# Patient Record
Sex: FEMALE | Race: WHITE
[De-identification: names, ages, dates, MRNs, and addresses within clinical notes are randomized per-mention and may not be internally consistent; named-entity substitution may affect disease eponyms.]

---

## 2018-08-08 ENCOUNTER — HOSPITAL ENCOUNTER (OUTPATIENT)
Dept: HOSPITAL 45 - C.GI | Age: 58
Discharge: HOME | End: 2018-08-08
Attending: INTERNAL MEDICINE
Payer: COMMERCIAL

## 2018-08-08 VITALS — DIASTOLIC BLOOD PRESSURE: 72 MMHG | HEART RATE: 58 BPM | SYSTOLIC BLOOD PRESSURE: 120 MMHG | OXYGEN SATURATION: 98 %

## 2018-08-08 VITALS
HEIGHT: 63.5 IN | WEIGHT: 126.26 LBS | BODY MASS INDEX: 22.09 KG/M2 | HEIGHT: 63.5 IN | BODY MASS INDEX: 22.09 KG/M2 | WEIGHT: 126.26 LBS

## 2018-08-08 VITALS — TEMPERATURE: 98.06 F

## 2018-08-08 DIAGNOSIS — Z12.11: Primary | ICD-10-CM

## 2018-08-08 DIAGNOSIS — D12.3: ICD-10-CM

## 2018-08-08 DIAGNOSIS — Z88.5: ICD-10-CM

## 2018-08-08 DIAGNOSIS — K64.8: ICD-10-CM

## 2018-08-08 NOTE — GI REPORT
Patient Name: Elaine Benavidez

Procedure Date: 8/8/2018 10:21 AM

MRN: [id number]

Account Number: [id number]

YOB: 1960

Admit Type: Outpatient

Age: 58

Gender: Female

Attending MD: Todd Sanchez DO

Procedure:            Colonoscopy

Providers:            Todd Sanchez DO

Referring MD:         ALDO Aguilar

Indications:          Screening for colorectal malignant neoplasm

Medicines:            Monitored Anesthesia Care

Complications:        No immediate complications.

Estimated Blood Loss: Estimated blood loss: none.

Procedure:            Pre-Anesthesia Assessment:

                      - Prior to the procedure, a History and Physical was 

                      performed, and patient medications and allergies were 

                      reviewed. The patient's tolerance of previous 

                      anesthesia was also reviewed. The risks and benefits of 

                      the procedure and the sedation options and risks were 

                      discussed with the patient. All questions were 

                      answered, and informed consent was obtained. Prior 

                      Anticoagulants: The patient has taken no previous 

                      anticoagulant or antiplatelet agents. ASA Grade 

                      Assessment: II - A patient with mild systemic disease. 

                      After reviewing the risks and benefits, the patient was 

                      deemed in satisfactory condition to undergo the 

                      procedure.

                      After I obtained informed consent, the scope was passed 

                      under direct vision. Throughout the procedure, the 

                      patient's blood pressure, pulse, and oxygen saturations 

                      were monitored continuously. The Scope was introduced 

                      through the anus and advanced to the terminal ileum. 

                      The colonoscopy was performed without difficulty. The 

                      patient tolerated the procedure well. The quality of 

                      the bowel preparation was good. The terminal ileum, 

                      ileocecal valve, appendiceal orifice, and rectum were 

                      photographed.

Findings:

     The perianal and digital rectal examinations were normal.

     There was evidence of a prior end-to-side ileo-colonic anastomosis in 

     the ascending colon. This was patent and was characterized by healthy 

     appearing mucosa. The anastomosis was traversed.

     Two sessile polyps were found in the sigmoid colon and transverse colon. 

     The polyps were 4 to 5 mm in size. These polyps were removed with a cold 

     snare. Resection and retrieval were complete.

     Non-bleeding internal hemorrhoids were found during retroflexion. The 

     hemorrhoids were small.

Impression:           - Patent end-to-side ileo-colonic anastomosis, 

                      characterized by healthy appearing mucosa.

                      - Two 4 to 5 mm polyps in the sigmoid colon and in the 

                      transverse colon, removed with a cold snare. Resected 

                      and retrieved.

                      - Non-bleeding internal hemorrhoids.

Recommendation:       - Resume previous diet.

                      - Continue present medications.

                      - Repeat colonoscopy for surveillance based on 

                      pathology results.

                      - Return to primary care physician as previously 

                      scheduled.

Todd FARIDEHJae Sanchez, DO

8/8/2018 11:13:33 AM

This report has been signed electronically.

Note Initiated On: 8/8/2018 10:21 AM

Number of Addenda: 0

     I attest to the content of the Intraoperative Record and orders 

     documented therein, exceptions below



{[id number]}

## 2018-08-08 NOTE — ANESTHESIOLOGY PROGRESS NOTE
Anesthesia Post Op Note


Date & Time


Aug 8, 2018 at 10:56





Vital Signs


Pain Intensity:  0





Vital Signs Past 12 Hours








  Date Time  Temp Pulse Resp B/P (MAP) Pulse Ox O2 Delivery O2 Flow Rate FiO2


 


8/8/18 10:53  66 18 108/60 (76) 96 Room Air  


 


8/8/18 10:44  65 16 106/64 (78) 97 Room Air  


 


8/8/18 09:53 36.7 77 20 121/79 (93) 96 Room Air  











Notes


Mental Status:  alert / awake / arousable, participated in evaluation


Pt Amnestic to Procedure:  Yes


Nausea / Vomiting:  adequately controlled


Pain:  adequately controlled


Airway Patency, RR, SpO2:  stable & adequate


BP & HR:  stable & adequate


Hydration State:  stable & adequate


Anesthetic Complications:  no major complications apparent

## 2018-08-08 NOTE — DISCHARGE INSTRUCTIONS
Endoscopy Patient Instructions


Date / Procedure(s) Performed


Aug 8, 2018.


Colonoscopy





Allergy Information


Coded Allergies:  


     Morphine (Verified  Allergy, Unknown, NAUSEA, 8/8/18)





Discharge Date / Findings


Aug 8, 2018.


Colon polyps


Internal hemorrhoids





Medication Instructions


OK to resume all medications today as prescribed





Reported Home Medications








 Medications  Dose


 Route/Sig


 Max Daily Dose Days Date Category


 


 Crestor


  (Rosuvastatin


 Calcium) 5 Mg Tab  1 Tab


 PO DAILY


   30 8/8/18 Reported


 


 Toprol-Xl


  (Metoprolol


 Succinate) 25 Mg


 Tabcr  25 Mg


 PO DAILY


    8/8/18 Reported


 


 [Testosterone Cr]


   1 Dose


 TOP DAILY


    8/6/18 Reported


 


 [Progesterone]    1 Tab


 PO HS


    8/6/18 Reported


 


 [Estrogen]    1 Tab


 PO TID


    8/6/18 Reported











Provider Instructions





Activity Restrictions





-  No exercising or heavy lifting for 24 hours. 


-  Do not drink alcohol the day of the procedure.


-  Do not drive a car or operate machinery until the day after the procedure.


-  Do not make any important decisions or sign important papers in 24 hours 

after the procedure.





Following Day:





-  Return to full activity which may include returning to work/school.





Diet





Start your diet with liquids and light foods (jello, soup, juice, toast).  Then 

eat your usual diet if not nauseated.





Treatment For Common After Affects





For mild abdominal pain, bloating, or excessive gas:





-  Rest


-  Eat lightly


-  Lie on right side





Follow-Up Information


Follow-up with Dr. Aguilar as scheduled





Anesthesia Information





What You Should Know





You have had a procedure that required some medicine to reduce anxiety and 

discomfort. This treatment is called moderate sedation.  


After receiving the treatment, you may be sleepy, but you will be able to 

breathe on your own.  The effects of the treatment may last for several hours.








Follow these instructions along with Activity/Diet recommendations noted above:





*  Do NOT do anything where dizziness or clumsiness would be dangerous.





*  Rest quietly at home today, then you can be up and about tomorrow.





*  Have a responsible person stay with you the rest of today.





*  You may have had an I.V. today.  If so, you may take the dressing off later 

today.





Recommendations


 


Call your doctor if:





*  Trouble breathing 





*  Continuous vomiting for more than 24 hours








*  Temperature above 101 degrees





*  Severe abdominal pain or bloating





*  Pain not relieved by pain medicine ordered





*  There is increased drainage or redness from any incision





*  A large amount of rectal bleeding greater than 2-3 tablespoons. 


   (If you had a polyp/s removed or have hemorrhoids, a small amount of blood -


    from the rectum is to be expected.)





*  You have any unanswered questions or concerns.








IN THE EVENT OF A SERIOUS EMERGENCY, GO TO THE NEAREST EMERGENCY ROOM








       Your discharge instructions were prepared by provider Todd Sanchez.





 Patient Instructions Signature Page














Elaine Benavidez 











Patient (or Guardian) Signature/Date:____________________________________ I 

have read and understand the instructions given to me by my caregivers.








Caregiver/RN/Doctor Signature/Date:____________________________________











The above-named patient and/or guardian has received patient instructions on 

this date.





























+  Original Patient Signature Page (only) stays with chart.  Please make copy 

for patient.

## 2018-08-08 NOTE — ENDO HISTORY AND PHYSICAL
History & Physical


Date of Service:


Aug 8, 2018.


Chief Complaint:


screening


Referring Physician:


Dr. Aguilar


History of Present Illness


59 yo CF who presents for screening colonoscopy.





Past Surgical History


Hx Cardiac Surgery:  No


Hx Internal Defibrillator:  No


Hx Pacemaker:  No


Hx Abdominal Surgery:  Yes (C SECTION X 4,HERNIA X 2,HYSTERECTOMY BSO,BOWEL 

RESECTION)


Hx of Implantable Prosthesis:  No


Hx Post-Op Nausea and Vomiting:  No


Hx Cancer Surgery:  No


Hx Thoracic Surgery:  No


Hx Orthopedic:  No


Hx Urinary Tract Surgery:  No





Social History


Smoking Status:  Never Smoker


Hx Substance Use:  No


Hx Alcohol Use:  No





Allergies


Coded Allergies:  


     Morphine (Verified  Allergy, Unknown, NAUSEA, 8/8/18)





Current Medications





Reported Home Medications








 Medications  Dose


 Route/Sig


 Max Daily Dose Days Date Category


 


 Crestor


  (Rosuvastatin


 Calcium) 5 Mg Tab  1 Tab


 PO DAILY


   30 8/8/18 Reported


 


 Toprol-Xl


  (Metoprolol


 Succinate) 25 Mg


 Tabcr  25 Mg


 PO DAILY


    8/8/18 Reported


 


 [Testosterone Cr]


   1 Dose


 TOP DAILY


    8/6/18 Reported


 


 [Progesterone]    1 Tab


 PO HS


    8/6/18 Reported


 


 [Estrogen]    1 Tab


 PO TID


    8/6/18 Reported











Vital Signs


Weight (Kilograms):  57.27


Height (Feet):  5


Height (Inches):  3.5











  Date Time  Temp Pulse Resp B/P (MAP) Pulse Ox O2 Delivery O2 Flow Rate FiO2


 


8/8/18 09:53 36.7 77 20 121/79 (93) 96 Room Air  











Physical Exam


General Appearance:  WD/WN, no apparent distress


Respiratory/Chest:  


   Auscultation:  breath sounds normal


Cardiovascular:  


   Heart Auscultation:  RRR


Abdomen:  


   Bowel Sounds:  normal


   Inspection & Palpation:  soft, non-distended, no tenderness, guarding & 

rebound





Assessment and Plan


Assessment:


59 yo CF who presents for screening colonoscopy.








Plan:


Proceed with colonoscopy.